# Patient Record
Sex: FEMALE | Race: WHITE
[De-identification: names, ages, dates, MRNs, and addresses within clinical notes are randomized per-mention and may not be internally consistent; named-entity substitution may affect disease eponyms.]

---

## 2019-12-09 ENCOUNTER — HOSPITAL ENCOUNTER (EMERGENCY)
Dept: HOSPITAL 33 - ED | Age: 54
Discharge: HOME | End: 2019-12-09
Payer: SELF-PAY

## 2019-12-09 VITALS — DIASTOLIC BLOOD PRESSURE: 91 MMHG | HEART RATE: 85 BPM | SYSTOLIC BLOOD PRESSURE: 138 MMHG | OXYGEN SATURATION: 97 %

## 2019-12-09 DIAGNOSIS — J40: Primary | ICD-10-CM

## 2019-12-09 DIAGNOSIS — R51: ICD-10-CM

## 2019-12-09 DIAGNOSIS — R05: ICD-10-CM

## 2019-12-09 PROCEDURE — 99284 EMERGENCY DEPT VISIT MOD MDM: CPT

## 2019-12-09 PROCEDURE — 96372 THER/PROPH/DIAG INJ SC/IM: CPT

## 2023-01-01 NOTE — ERPHSYRPT
- History of Present Illness


Time Seen by Provider: 12/09/19 06:10


Source: patient


Exam Limitations: no limitations


Patient Subjective Stated Complaint: pt to ER with complaints of cough. pt 

states she had this same cough 3 weeks ago and was given antibiotics and 

steriods and finished them, got better then got worse again yesterday.


Triage Nursing Assessment: pt to ER with complaints of productive cough. pt had 

cough 3 weeks ago, got antibiotics and steriods and started feeling better. pt 

states it got worse again yesterday.


Physician History: 





55 y/o white female presents with approx one month h/o cough. pt was seen at 

[facility] 3 weeks ago. given rx for amoxicillin and received a shot of 

steroids. sx seemed to improve. 3 days ago coughing spell recurred has headache 

from coughing so much. no n/v/d. no cp, no abd pain. no fever. 


Timing/Duration: day(s) (3), worse


Cough Quality/Degree: moderate, dry cough


Possible Cause: occasional episodes


Associated Symptoms: cough, headache (with cough), No fever, No chest pain/

soreness, No shortness of breath


Allergies/Adverse Reactions: 








No Known Drug Allergies Allergy (Verified 12/09/19 05:54)


 





Hx Tetanus, Diphtheria Vaccination/Date Given: Yes


Hx Influenza Vaccination/Date Given: No


Hx Pneumococcal Vaccination/Date Given: No


Immunizations Up to Date: Yes





- Review of Systems


Constitutional: No Symptoms


Eyes: No Symptoms


Ears, Nose, & Throat: No Symptoms


Respiratory: Cough


Cardiac: No Symptoms, No Chest Pain, No Palpitations


Abdominal/Gastrointestinal: No Symptoms, No Abdominal Pain, No Nausea, No 

Vomiting, No Diarrhea


Genitourinary Symptoms: No Symptoms


Musculoskeletal: No Symptoms


Skin: No Symptoms


Neurological: No Symptoms


Psychological: No Symptoms


Endocrine: No Symptoms


Hematologic/Lymphatic: No Symptoms


Immunological/Allergic: No Symptoms


All Other Systems: Reviewed and Negative





- Past Medical History


Pertinent Past Medical History: No


Neurological History: No Pertinent History


ENT History: No Pertinent History


Cardiac History: No Pertinent History


Respiratory History: No Pertinent History


Endocrine Medical History: No Pertinent History


Musculoskeletal History: No Pertinent History


GI Medical History: No Pertinent History


 History: No Pertinent History


Psycho-Social History: No Pertinent History


Female Reproductive Disorders: No Pertinent History





- Past Surgical History


Past Surgical History: Yes


Neuro Surgical History: No Pertinent History


Cardiac: No Pertinent History


Respiratory: No Pertinent History


Gastrointestinal: Cholecystectomy


Musculoskeletal: No Pertinent History


Female Surgical History: Tubal Ligation





- Social History


Smoking Status: Never smoker


Exposure to second hand smoke: No


Drug Use: none


Patient Lives Alone: No





- Female History


Hx Pregnant Now: No





- Nursing Vital Signs


Nursing Vital Signs: 





 Initial Vital Signs











Temperature  99 F   12/09/19 05:47


 


Pulse Rate  95 H  12/09/19 05:47


 


Respiratory Rate  18   12/09/19 05:47


 


Blood Pressure  188/91   12/09/19 05:47


 


O2 Sat by Pulse Oximetry  97   12/09/19 05:47














- Physical Exam


General Appearance: no apparent distress, alert, anxiety


Eye Exam: PERRL/EOMI, eyes nml inspection


Ears, Nose, Throat Exam: normal ENT inspection, moist mucous membranes


Neck Exam: normal inspection, non-tender, supple, full range of motion


Respiratory Exam: airway intact, wheezing (mild wheezing left side), No chest 

tenderness, No respiratory distress


Cardiovascular Exam: regular rate/rhythm, normal heart sounds, normal 

peripheral pulses


Gastrointestinal/Abdomen Exam: soft, normal bowel sounds, No tenderness, No 

guarding, No rebound


Pelvic Exam: not done


Rectal Exam: not done


Back Exam: normal inspection, normal range of motion, No CVA tenderness, No 

vertebral tenderness


Extremity Exam: normal inspection, normal range of motion, pelvis stable


Neurologic Exam: alert, oriented x 3, cooperative, CNs II-XII nml as tested


Skin Exam: normal color, warm, dry


Lymphatic Exam: No adenopathy


**SpO2 Interpretation**: normal


SpO2: 99


O2 Delivery: Room Air





- Course


Nursing assessment & vital signs reviewed: Yes





- Progress


Progress: unchanged


Air Movement: good


Blood Culture(s) Obtained: No


Antibiotics given: Yes


Counseled pt/family regarding: diagnosis, need for follow-up





- Departure


Departure Disposition: Home


Clinical Impression: 


 Bronchitis





Condition: Stable


Critical Care Time: No


Referrals: 


VARUN FELICIANO [Primary Care Provider] - 


Additional Instructions: 


drink plenty of fluids. avoid exposure to any type of smoke. follow up with 

primary doctor for further management


Prescriptions: 


Albuterol 8 gm Mdi Hfa*** [Ventolin Hfa MDI***] 8 gm IH Q4H #1 [id number].[id number]


Azithromycin 250 mg*** [Zithromax 250 MG TABLET***] 250 mg PO ZPACK #6 tablet


Hydrocodone Bit/Acetaminophen [Hydrocodone-Acetaminophen Soln] 10 ml PO Q6H #

120 ml


Prednisone 10 mg*** [Deltasone 10 mg***] 10 mg PO TID #12 tablet 79.838